# Patient Record
Sex: MALE | Race: OTHER | NOT HISPANIC OR LATINO
[De-identification: names, ages, dates, MRNs, and addresses within clinical notes are randomized per-mention and may not be internally consistent; named-entity substitution may affect disease eponyms.]

---

## 2019-09-10 PROBLEM — Z00.00 ENCOUNTER FOR PREVENTIVE HEALTH EXAMINATION: Status: ACTIVE | Noted: 2019-09-10

## 2019-09-12 ENCOUNTER — APPOINTMENT (OUTPATIENT)
Dept: OTOLARYNGOLOGY | Facility: CLINIC | Age: 34
End: 2019-09-12

## 2023-07-21 ENCOUNTER — APPOINTMENT (OUTPATIENT)
Dept: OTOLARYNGOLOGY | Facility: CLINIC | Age: 38
End: 2023-07-21
Payer: COMMERCIAL

## 2023-07-21 VITALS
WEIGHT: 210 LBS | DIASTOLIC BLOOD PRESSURE: 85 MMHG | SYSTOLIC BLOOD PRESSURE: 134 MMHG | HEIGHT: 67 IN | HEART RATE: 82 BPM | TEMPERATURE: 97 F | BODY MASS INDEX: 32.96 KG/M2

## 2023-07-21 DIAGNOSIS — Z82.49 FAMILY HISTORY OF ISCHEMIC HEART DISEASE AND OTHER DISEASES OF THE CIRCULATORY SYSTEM: ICD-10-CM

## 2023-07-21 DIAGNOSIS — H93.11 TINNITUS, RIGHT EAR: ICD-10-CM

## 2023-07-21 PROCEDURE — 99203 OFFICE O/P NEW LOW 30 MIN: CPT

## 2023-07-21 PROCEDURE — 92550 TYMPANOMETRY & REFLEX THRESH: CPT | Mod: 52

## 2023-07-21 PROCEDURE — 92557 COMPREHENSIVE HEARING TEST: CPT

## 2023-07-21 PROCEDURE — 99204 OFFICE O/P NEW MOD 45 MIN: CPT

## 2023-07-21 NOTE — PHYSICAL EXAM
[de-identified] : soft, flat [de-identified] : Au: EAC clear, TM intact, ME clear [Midline] : trachea located in midline position [Normal] : no rashes

## 2023-07-21 NOTE — ASSESSMENT
[FreeTextEntry1] : 37M here for initial evaluation. For the past 6-7 months, he c/o noise in his right ear. It is constant and high pitched, loudest in quiet settings. There is also occasional right ear discomfort w pressure. There is no otorrhea, otalgia or vertigo and no noticeable hearing loss. He had seen ENT already who tried nasal rinses and mouth guards (?) which did not help. MRI IAC 3/2023 is normal. Audiogram from today shows essentially normal bilateral hearing, with asymmetries at 6-8khz, right side worse. 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 This was reviewed in detail w pt. I discussed diet/lifestyle measures to maintain quality of life in addition to sx diary, stress management and sleep optimization.

## 2023-07-21 NOTE — HISTORY OF PRESENT ILLNESS
[de-identified] : 37M here for initial evaluation.\par \par For the past 6-7 months, he c/o noise in his right ear. It is constant and high pitched, loudest in quiet settings. There is also occasional right ear discomfort w pressure. There is no otorrhea, otalgia or vertigo and no noticeable hearing loss. He had seen ENT already who tried nasal rinses and mouth guards (?) which did not help.\par MRI IAC 3/2023 is normal.\par \par Audiogram from today:\par -essentially normal bilateral hearing, with asymmetries at 6-8khz, right side worse.\par -SRT 5, 100%\par -type A\par \par ROS otherwise unremarkable.

## 2023-10-20 ENCOUNTER — TRANSCRIPTION ENCOUNTER (OUTPATIENT)
Age: 38
End: 2023-10-20

## 2024-11-18 ENCOUNTER — APPOINTMENT (OUTPATIENT)
Dept: OTOLARYNGOLOGY | Facility: CLINIC | Age: 39
End: 2024-11-18
Payer: COMMERCIAL

## 2024-11-18 DIAGNOSIS — M26.609 UNSPECIFIED TEMPOROMANDIBULAR JOINT DISORDER: ICD-10-CM

## 2024-11-18 DIAGNOSIS — Z80.9 FAMILY HISTORY OF MALIGNANT NEOPLASM, UNSPECIFIED: ICD-10-CM

## 2024-11-18 DIAGNOSIS — H93.11 TINNITUS, RIGHT EAR: ICD-10-CM

## 2024-11-18 DIAGNOSIS — J30.0 VASOMOTOR RHINITIS: ICD-10-CM

## 2024-11-18 DIAGNOSIS — Z78.9 OTHER SPECIFIED HEALTH STATUS: ICD-10-CM

## 2024-11-18 DIAGNOSIS — H90.5 UNSPECIFIED SENSORINEURAL HEARING LOSS: ICD-10-CM

## 2024-11-18 PROCEDURE — 31231 NASAL ENDOSCOPY DX: CPT

## 2024-11-18 PROCEDURE — 92567 TYMPANOMETRY: CPT

## 2024-11-18 PROCEDURE — 92504 EAR MICROSCOPY EXAMINATION: CPT

## 2024-11-18 PROCEDURE — 92557 COMPREHENSIVE HEARING TEST: CPT

## 2024-11-18 PROCEDURE — 99204 OFFICE O/P NEW MOD 45 MIN: CPT | Mod: 25

## 2024-11-18 RX ORDER — FLUTICASONE PROPIONATE 50 UG/1
50 SPRAY, METERED NASAL
Qty: 1 | Refills: 3 | Status: ACTIVE | COMMUNITY
Start: 2024-11-18 | End: 1900-01-01

## 2024-11-19 PROBLEM — H90.5 SENSORINEURAL HEARING LOSS (SNHL) OF RIGHT EAR, UNSPECIFIED HEARING STATUS ON CONTRALATERAL SIDE: Status: ACTIVE | Noted: 2024-11-18
